# Patient Record
Sex: FEMALE | Race: WHITE | NOT HISPANIC OR LATINO | URBAN - METROPOLITAN AREA
[De-identification: names, ages, dates, MRNs, and addresses within clinical notes are randomized per-mention and may not be internally consistent; named-entity substitution may affect disease eponyms.]

---

## 2017-10-04 ENCOUNTER — NEW PATIENT (OUTPATIENT)
Dept: URBAN - METROPOLITAN AREA CLINIC 85 | Facility: CLINIC | Age: 77
End: 2017-10-04
Payer: COMMERCIAL

## 2017-10-04 DIAGNOSIS — R51 HEADACHE: ICD-10-CM

## 2017-10-04 PROCEDURE — 92004 COMPRE OPH EXAM NEW PT 1/>: CPT | Performed by: OPTOMETRIST

## 2017-10-04 ASSESSMENT — INTRAOCULAR PRESSURE
OS: 14
OD: 14

## 2017-10-04 ASSESSMENT — VISUAL ACUITY
OD: 20/20
OS: 20/20

## 2018-07-20 ENCOUNTER — FOLLOW UP ESTABLISHED (OUTPATIENT)
Dept: URBAN - METROPOLITAN AREA CLINIC 85 | Facility: CLINIC | Age: 78
End: 2018-07-20
Payer: COMMERCIAL

## 2018-07-20 DIAGNOSIS — H16.203 UNSPECIFIED KERATOCONJUNCTIVITIS, BILATERAL: Primary | ICD-10-CM

## 2018-07-20 DIAGNOSIS — H53.2 DIPLOPIA: ICD-10-CM

## 2018-07-20 PROCEDURE — 92014 COMPRE OPH EXAM EST PT 1/>: CPT | Performed by: OPTOMETRIST

## 2018-07-20 RX ORDER — TOBRAMYCIN AND DEXAMETHASONE 3; 1 MG/ML; MG/ML
SUSPENSION/ DROPS OPHTHALMIC
Qty: 1 | Refills: 0 | Status: ACTIVE
Start: 2018-07-20

## 2018-07-20 ASSESSMENT — INTRAOCULAR PRESSURE
OD: 14
OS: 15

## 2018-09-25 ENCOUNTER — OFFICE VISIT (OUTPATIENT)
Dept: URBAN - METROPOLITAN AREA CLINIC 76 | Facility: CLINIC | Age: 78
End: 2018-09-25
Payer: COMMERCIAL

## 2018-09-25 DIAGNOSIS — H05.241 CONSTANT EXOPHTHALMOS, RIGHT EYE: Primary | ICD-10-CM

## 2018-09-25 PROCEDURE — 99204 OFFICE O/P NEW MOD 45 MIN: CPT | Performed by: OPHTHALMOLOGY

## 2018-09-25 ASSESSMENT — INTRAOCULAR PRESSURE
OD: 13
OS: 12

## 2018-09-25 NOTE — IMPRESSION/PLAN
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 Plan: Discussed diagnosis in detail with patient. Discussed treatment options with patient. Return to PCP for work up with recommended CT angiogram as per MRI report. Neurosurgery referral vs. interventional neuroradiology as needed. No Oculoplastic surgery or intervention recommended at this point. Patient to follow up with PCP ASAP.